# Patient Record
Sex: FEMALE | Race: WHITE | NOT HISPANIC OR LATINO | URBAN - METROPOLITAN AREA
[De-identification: names, ages, dates, MRNs, and addresses within clinical notes are randomized per-mention and may not be internally consistent; named-entity substitution may affect disease eponyms.]

---

## 2017-08-01 ENCOUNTER — INPATIENT (INPATIENT)
Facility: HOSPITAL | Age: 62
LOS: 18 days | Discharge: HOME | End: 2017-08-20
Attending: PSYCHIATRY & NEUROLOGY | Admitting: PSYCHIATRY & NEUROLOGY
Payer: MEDICARE

## 2017-08-01 VITALS
SYSTOLIC BLOOD PRESSURE: 124 MMHG | RESPIRATION RATE: 16 BRPM | OXYGEN SATURATION: 99 % | DIASTOLIC BLOOD PRESSURE: 80 MMHG | TEMPERATURE: 98 F | HEART RATE: 82 BPM

## 2017-08-01 DIAGNOSIS — F20.9 SCHIZOPHRENIA, UNSPECIFIED: ICD-10-CM

## 2017-08-01 DIAGNOSIS — R69 ILLNESS, UNSPECIFIED: ICD-10-CM

## 2017-08-01 DIAGNOSIS — F25.0 SCHIZOAFFECTIVE DISORDER, BIPOLAR TYPE: ICD-10-CM

## 2017-08-01 LAB
ALBUMIN SERPL ELPH-MCNC: 4.4 G/DL — SIGNIFICANT CHANGE UP (ref 3.3–5)
ALP SERPL-CCNC: 79 U/L — SIGNIFICANT CHANGE UP (ref 40–120)
ALT FLD-CCNC: 46 U/L — HIGH (ref 4–33)
AMPHET UR-MCNC: NEGATIVE — SIGNIFICANT CHANGE UP
APAP SERPL-MCNC: < 15 UG/ML — LOW (ref 15–25)
APPEARANCE UR: CLEAR — SIGNIFICANT CHANGE UP
AST SERPL-CCNC: 44 U/L — HIGH (ref 4–32)
BARBITURATES MEASUREMENT: NEGATIVE — SIGNIFICANT CHANGE UP
BARBITURATES UR SCN-MCNC: NEGATIVE — SIGNIFICANT CHANGE UP
BASOPHILS # BLD AUTO: 0.03 K/UL — SIGNIFICANT CHANGE UP (ref 0–0.2)
BASOPHILS NFR BLD AUTO: 0.3 % — SIGNIFICANT CHANGE UP (ref 0–2)
BENZODIAZ SERPL-MCNC: NEGATIVE — SIGNIFICANT CHANGE UP
BENZODIAZ UR-MCNC: NEGATIVE — SIGNIFICANT CHANGE UP
BILIRUB SERPL-MCNC: 0.3 MG/DL — SIGNIFICANT CHANGE UP (ref 0.2–1.2)
BILIRUB UR-MCNC: NEGATIVE — SIGNIFICANT CHANGE UP
BLOOD UR QL VISUAL: NEGATIVE — SIGNIFICANT CHANGE UP
BUN SERPL-MCNC: 22 MG/DL — SIGNIFICANT CHANGE UP (ref 7–23)
CALCIUM SERPL-MCNC: 9.3 MG/DL — SIGNIFICANT CHANGE UP (ref 8.4–10.5)
CANNABINOIDS UR-MCNC: NEGATIVE — SIGNIFICANT CHANGE UP
CHLORIDE SERPL-SCNC: 102 MMOL/L — SIGNIFICANT CHANGE UP (ref 98–107)
CO2 SERPL-SCNC: 21 MMOL/L — LOW (ref 22–31)
COCAINE METAB.OTHER UR-MCNC: NEGATIVE — SIGNIFICANT CHANGE UP
COD CRY URNS QL: SIGNIFICANT CHANGE UP (ref 0–0)
COLOR SPEC: YELLOW — SIGNIFICANT CHANGE UP
CREAT SERPL-MCNC: 0.77 MG/DL — SIGNIFICANT CHANGE UP (ref 0.5–1.3)
EOSINOPHIL # BLD AUTO: 0.01 K/UL — SIGNIFICANT CHANGE UP (ref 0–0.5)
EOSINOPHIL NFR BLD AUTO: 0.1 % — SIGNIFICANT CHANGE UP (ref 0–6)
ETHANOL BLD-MCNC: < 10 MG/DL — SIGNIFICANT CHANGE UP
GLUCOSE SERPL-MCNC: 104 MG/DL — HIGH (ref 70–99)
GLUCOSE UR-MCNC: NEGATIVE — SIGNIFICANT CHANGE UP
HCT VFR BLD CALC: 41.4 % — SIGNIFICANT CHANGE UP (ref 34.5–45)
HGB BLD-MCNC: 13.6 G/DL — SIGNIFICANT CHANGE UP (ref 11.5–15.5)
IMM GRANULOCYTES # BLD AUTO: 0.04 # — SIGNIFICANT CHANGE UP
IMM GRANULOCYTES NFR BLD AUTO: 0.4 % — SIGNIFICANT CHANGE UP (ref 0–1.5)
KETONES UR-MCNC: NEGATIVE — SIGNIFICANT CHANGE UP
LEUKOCYTE ESTERASE UR-ACNC: NEGATIVE — SIGNIFICANT CHANGE UP
LYMPHOCYTES # BLD AUTO: 2.18 K/UL — SIGNIFICANT CHANGE UP (ref 1–3.3)
LYMPHOCYTES # BLD AUTO: 20.9 % — SIGNIFICANT CHANGE UP (ref 13–44)
MCHC RBC-ENTMCNC: 29.3 PG — SIGNIFICANT CHANGE UP (ref 27–34)
MCHC RBC-ENTMCNC: 32.9 % — SIGNIFICANT CHANGE UP (ref 32–36)
MCV RBC AUTO: 89.2 FL — SIGNIFICANT CHANGE UP (ref 80–100)
METHADONE UR-MCNC: NEGATIVE — SIGNIFICANT CHANGE UP
MONOCYTES # BLD AUTO: 0.97 K/UL — HIGH (ref 0–0.9)
MONOCYTES NFR BLD AUTO: 9.3 % — SIGNIFICANT CHANGE UP (ref 2–14)
MUCOUS THREADS # UR AUTO: SIGNIFICANT CHANGE UP
NEUTROPHILS # BLD AUTO: 7.18 K/UL — SIGNIFICANT CHANGE UP (ref 1.8–7.4)
NEUTROPHILS NFR BLD AUTO: 69 % — SIGNIFICANT CHANGE UP (ref 43–77)
NITRITE UR-MCNC: NEGATIVE — SIGNIFICANT CHANGE UP
NRBC # FLD: 0 — SIGNIFICANT CHANGE UP
OPIATES UR-MCNC: NEGATIVE — SIGNIFICANT CHANGE UP
OXYCODONE UR-MCNC: NEGATIVE — SIGNIFICANT CHANGE UP
PCP UR-MCNC: NEGATIVE — SIGNIFICANT CHANGE UP
PH UR: 5.5 — SIGNIFICANT CHANGE UP (ref 4.6–8)
PLATELET # BLD AUTO: 217 K/UL — SIGNIFICANT CHANGE UP (ref 150–400)
PMV BLD: 12.3 FL — SIGNIFICANT CHANGE UP (ref 7–13)
POTASSIUM SERPL-MCNC: 3.8 MMOL/L — SIGNIFICANT CHANGE UP (ref 3.5–5.3)
POTASSIUM SERPL-SCNC: 3.8 MMOL/L — SIGNIFICANT CHANGE UP (ref 3.5–5.3)
PROT SERPL-MCNC: 7 G/DL — SIGNIFICANT CHANGE UP (ref 6–8.3)
PROT UR-MCNC: 20 — SIGNIFICANT CHANGE UP
RBC # BLD: 4.64 M/UL — SIGNIFICANT CHANGE UP (ref 3.8–5.2)
RBC # FLD: 13.6 % — SIGNIFICANT CHANGE UP (ref 10.3–14.5)
SALICYLATES SERPL-MCNC: < 5 MG/DL — LOW (ref 15–30)
SODIUM SERPL-SCNC: 141 MMOL/L — SIGNIFICANT CHANGE UP (ref 135–145)
SP GR SPEC: 1.03 — SIGNIFICANT CHANGE UP (ref 1–1.03)
SQUAMOUS # UR AUTO: SIGNIFICANT CHANGE UP
TSH SERPL-MCNC: 1.87 UIU/ML — SIGNIFICANT CHANGE UP (ref 0.27–4.2)
UROBILINOGEN FLD QL: NORMAL E.U. — SIGNIFICANT CHANGE UP (ref 0.1–0.2)
WBC # BLD: 10.41 K/UL — SIGNIFICANT CHANGE UP (ref 3.8–10.5)
WBC # FLD AUTO: 10.41 K/UL — SIGNIFICANT CHANGE UP (ref 3.8–10.5)
WBC UR QL: SIGNIFICANT CHANGE UP (ref 0–?)

## 2017-08-01 PROCEDURE — 99285 EMERGENCY DEPT VISIT HI MDM: CPT

## 2017-08-01 RX ORDER — HALOPERIDOL DECANOATE 100 MG/ML
5 INJECTION INTRAMUSCULAR ONCE
Qty: 0 | Refills: 0 | Status: COMPLETED | OUTPATIENT
Start: 2017-08-01 | End: 2017-08-01

## 2017-08-01 RX ORDER — RISPERIDONE 4 MG/1
1 TABLET ORAL
Qty: 0 | Refills: 0 | Status: DISCONTINUED | OUTPATIENT
Start: 2017-08-01 | End: 2017-08-01

## 2017-08-01 RX ORDER — HALOPERIDOL DECANOATE 100 MG/ML
5 INJECTION INTRAMUSCULAR EVERY 6 HOURS
Qty: 0 | Refills: 0 | Status: DISCONTINUED | OUTPATIENT
Start: 2017-08-01 | End: 2017-08-20

## 2017-08-01 RX ORDER — RISPERIDONE 4 MG/1
2 TABLET ORAL AT BEDTIME
Qty: 0 | Refills: 0 | Status: DISCONTINUED | OUTPATIENT
Start: 2017-08-01 | End: 2017-08-03

## 2017-08-01 RX ORDER — HALOPERIDOL DECANOATE 100 MG/ML
5 INJECTION INTRAMUSCULAR ONCE
Qty: 0 | Refills: 0 | Status: DISCONTINUED | OUTPATIENT
Start: 2017-08-01 | End: 2017-08-20

## 2017-08-01 RX ADMIN — Medication 2 MILLIGRAM(S): at 12:18

## 2017-08-01 RX ADMIN — HALOPERIDOL DECANOATE 5 MILLIGRAM(S): 100 INJECTION INTRAMUSCULAR at 12:18

## 2017-08-01 NOTE — ED BEHAVIORAL HEALTH ASSESSMENT NOTE - OTHER PAST PSYCHIATRIC HISTORY (INCLUDE DETAILS REGARDING ONSET, COURSE OF ILLNESS, INPATIENT/OUTPATIENT TREATMENT)
Patient had several hospitalizations, including at [facility] in 1970's-80's. Last admitted to [facility]PE[facility] at [facility] in Aug 2016.  Was living in a group home in her 20's and 30's but has lived independently for the past 20 years.  Most recently followed at [facility].

## 2017-08-01 NOTE — ED ADULT NURSE NOTE - OBJECTIVE STATEMENT
p/t BIBA agitated from home, p/t is restless,  with flight of ideas not cooperative @ present MD by the bedside

## 2017-08-01 NOTE — ED PROVIDER NOTE - CHPI ED SYMPTOMS NEG
no weakness/no disorientation/no paranoia/no hallucinations/no change in level of consciousness/no homicidal/no confusion/no suicidal

## 2017-08-01 NOTE — ED BEHAVIORAL HEALTH ASSESSMENT NOTE - DETAILS
Robel-sedation; ginol-retinal damage Brother with mental illness and has been hospitalized in the past Gwen, medical student Neighbor/friend aware

## 2017-08-01 NOTE — ED BEHAVIORAL HEALTH ASSESSMENT NOTE - SUMMARY
62 year old F with chronic psychotic disorder who has been relatively stable in treatment for past 30+ years but stopped all meds 2 weeks ago and has decompensated. She is now disorganized, with perseveration, thought blocking, mood lability, and paranoid ideation.  She has not been responding to family and in her disorganized and psychotic state, has been caring poorly for herself necessitating inpatient admission.

## 2017-08-01 NOTE — ED PROVIDER NOTE - ATTENDING CONTRIBUTION TO CARE
Seen and examined, have discussed plan of care with NP.   I agree with note as stated above, having amended the EMR as needed to reflect the findings.

## 2017-08-01 NOTE — ED BEHAVIORAL HEALTH ASSESSMENT NOTE - DESCRIPTION
As above. Received Haldol 5mg/Ativan 2mg IM for agitation at 11:15A Retinal damage Lives alone.  Sister in [place].  Never . No children. Worked as  for 30 years, now retired

## 2017-08-01 NOTE — ED ADULT TRIAGE NOTE - CHIEF COMPLAINT QUOTE
Pt non compliant with meds  x several weeks brought in by ems and family member with increasing agitation.

## 2017-08-01 NOTE — ED ADULT NURSE REASSESSMENT NOTE - NS ED NURSE REASSESS COMMENT FT1
14:50-Patient transported to [address] via EMS, safety maintained.
Received pt currently sleeping in  room 3, NAD, needs met, blood work and EKG done, pt currently awaiting further MD evaluation, will continue to monitor pt.

## 2017-08-01 NOTE — ED BEHAVIORAL HEALTH ASSESSMENT NOTE - PSYCHIATRIC ISSUES AND PLAN (INCLUDE STANDING AND PRN MEDICATION)
1. Restart Risperdal 1mg BID for psychosis 2. Haldol/Ativan PRN agitation 3. Contact [facility] providers as their office is closed today due to the [facility] Holiday

## 2017-08-01 NOTE — ED PROVIDER NOTE - MEDICAL DECISION MAKING DETAILS
63y/o Female hx of schizophrenia non compliant with meds brought to ed for psych eval in setting of agitation.  Pt is well appearing w/o visible signs of physical injuries on exam, alert and oriented, articulate speech, head NCAT, no C-spine tend, CN II- XII intact, strength 5/5 x 4 limbs, ambulating w/ steady gait, lungs are clear bilat, cor rrr, abd sft, nt, nd, nr, no guarding, ext no edema.  Remainder of plan as per psych-         63y/o Female hx of schizophrenia non compliant with meds brought to ed for psych eval in setting of agitation.  Pt is disorganized, no visible signs of physical injuries on exam, alert and oriented, articulate speech, head NCAT, no C-spine tend, ambulating w/ steady gait, lungs are clear bilat, cor rrr, abd sft, nt, nd, nr, no guarding, ext no edema, equal strength to all extremities.  Labs wnl- medically clear for psych admission-

## 2017-08-01 NOTE — ED PROVIDER NOTE - OBJECTIVE STATEMENT
The patient is a 62y Female hx of schizophrenia non compliant with meds brought to ed for psych eval in setting of agitation.  Pt denies all medical complaints, no recent injuries, trauma or falls, no headache, back or neck pain, no abd/flank pain, no uti/urinary symptoms, nausea or vomiting, no fever or chills, no cp or sob, no palpitations or diaphoresis.  Denies etoh or illicit drugs, no SI/HI, no AVH.  Endorsed no other symptoms.

## 2017-08-01 NOTE — ED BEHAVIORAL HEALTH ASSESSMENT NOTE - DIFFERENTIAL
Will have working diagnosis of schizophrenia but may actually be schizoaffective disorder, bipolar type given some symptoms for tawanda

## 2017-08-01 NOTE — ED BEHAVIORAL HEALTH ASSESSMENT NOTE - HPI (INCLUDE ILLNESS QUALITY, SEVERITY, DURATION, TIMING, CONTEXT, MODIFYING FACTORS, ASSOCIATED SIGNS AND SYMPTOMS)
Ms. Cordova is a 62 year old Nondenominational,  F who lives alone.  She has a history of schizophrenia and most recently was treated by [facility] but abruptly discontinued treatment with them approx 2 weeks ago.  She has multile prior inpatient admissions, mostly in the 1970's and 1980s, but also in August 2016 at [facility] (possibly just [facility]).  In the setting of abrupt discontinuation of treatment over the past 2 weeks, the patient has recently been paranoid, disoriented, acting strangely, and her friend became concerned. Today, she came to her friend's house and was talking nonsensically and her friend activated "[employer] [facility] to bring her to the ED for a psychiatric evaluation.    According to her sister and friend, the patient is Ms. Cordova is a 62 year old Yazidism,  F who lives alone.  She has a history of schizophrenia and most recently was treated by [facility] but abruptly discontinued treatment with them approx 2 weeks ago.  She has multile prior inpatient admissions, mostly in the 1970's and 1980s, but also in August 2016 at [facility] (possibly just [facility]).  In the setting of abrupt discontinuation of treatment over the past 2 weeks, the patient has recently been paranoid, disoriented, acting strangely, and her friend became concerned. Today, she came to her friend's house and was talking nonsensically and her [facility] ED for a psychiatric evaluation.    According to her sister and friend, the patient does well when she is on her medications and is able to fully take care of self.  This was the case as recently as 1 month ago when she stayed with her sister for 2 weeks in [place].  However, for some reason, the patient decided to abruptly terminate her treatment with her therapist and psychiatrist at [facility] 2 weeks ago.  She was taking risperdal and xanax (sister does not know doses). Since that time, she has not been taking her sister's phone calls and has been hanging up on friends and family. She has been expressing concern that her brother is dead (he is living). according to her friend, there have been occasions when she was noted to be screaming while walking down the street in the neighborhood. She has also been complaining of strange smells and threw out all of her clothes yesterday, convinced that they were contaminated.      In the ED, patient is labile and hostile, refusing to change into gown. She was shouting about how "I don't want to talk to Dalia!"  (Dalia is apparently her therapist).  She repeats the same phrases over and over "I have the right to decide what happens!" and "Tell me what is happening!"  SHe is not oriented to time or situation. She denies SI or HI but did get aggressive with staff when they tried to assist her with changing clothes. She does mention smelling something "strange" and worries about this.  She expresses an opinion that she does not need any mental health treatment.

## 2017-08-02 PROCEDURE — 99222 1ST HOSP IP/OBS MODERATE 55: CPT | Mod: GC

## 2017-08-02 RX ADMIN — RISPERIDONE 2 MILLIGRAM(S): 4 TABLET ORAL at 15:51

## 2017-08-03 LAB
CHOLEST SERPL-MCNC: 187 MG/DL — SIGNIFICANT CHANGE UP (ref 120–199)
HBA1C BLD-MCNC: 5.5 % — SIGNIFICANT CHANGE UP (ref 4–5.6)
HDLC SERPL-MCNC: 89 MG/DL — HIGH (ref 45–65)
LIPID PNL WITH DIRECT LDL SERPL: 90 MG/DL — SIGNIFICANT CHANGE UP
TRIGL SERPL-MCNC: 63 MG/DL — SIGNIFICANT CHANGE UP (ref 10–149)

## 2017-08-03 PROCEDURE — 99232 SBSQ HOSP IP/OBS MODERATE 35: CPT | Mod: GC

## 2017-08-03 RX ORDER — RISPERIDONE 4 MG/1
3 TABLET ORAL AT BEDTIME
Qty: 0 | Refills: 0 | Status: DISCONTINUED | OUTPATIENT
Start: 2017-08-03 | End: 2017-08-04

## 2017-08-03 RX ORDER — TRAZODONE HCL 50 MG
50 TABLET ORAL AT BEDTIME
Qty: 0 | Refills: 0 | Status: DISCONTINUED | OUTPATIENT
Start: 2017-08-03 | End: 2017-08-14

## 2017-08-03 RX ORDER — LANOLIN ALCOHOL/MO/W.PET/CERES
3 CREAM (GRAM) TOPICAL AT BEDTIME
Qty: 0 | Refills: 0 | Status: DISCONTINUED | OUTPATIENT
Start: 2017-08-03 | End: 2017-08-20

## 2017-08-03 RX ADMIN — RISPERIDONE 3 MILLIGRAM(S): 4 TABLET ORAL at 21:27

## 2017-08-03 RX ADMIN — Medication 2 MILLIGRAM(S): at 05:18

## 2017-08-03 RX ADMIN — Medication 1 MILLIGRAM(S): at 21:28

## 2017-08-03 RX ADMIN — HALOPERIDOL DECANOATE 5 MILLIGRAM(S): 100 INJECTION INTRAMUSCULAR at 05:18

## 2017-08-03 RX ADMIN — Medication 3 MILLIGRAM(S): at 21:27

## 2017-08-04 PROCEDURE — 99232 SBSQ HOSP IP/OBS MODERATE 35: CPT | Mod: GC

## 2017-08-04 RX ORDER — RISPERIDONE 4 MG/1
4 TABLET ORAL AT BEDTIME
Qty: 0 | Refills: 0 | Status: DISCONTINUED | OUTPATIENT
Start: 2017-08-04 | End: 2017-08-17

## 2017-08-04 RX ADMIN — RISPERIDONE 4 MILLIGRAM(S): 4 TABLET ORAL at 20:59

## 2017-08-04 RX ADMIN — Medication 50 MILLIGRAM(S): at 21:00

## 2017-08-05 PROCEDURE — 99232 SBSQ HOSP IP/OBS MODERATE 35: CPT

## 2017-08-05 RX ADMIN — Medication 1 MILLIGRAM(S): at 00:55

## 2017-08-05 RX ADMIN — Medication 3 MILLIGRAM(S): at 21:22

## 2017-08-05 RX ADMIN — HALOPERIDOL DECANOATE 5 MILLIGRAM(S): 100 INJECTION INTRAMUSCULAR at 00:55

## 2017-08-05 RX ADMIN — RISPERIDONE 4 MILLIGRAM(S): 4 TABLET ORAL at 20:25

## 2017-08-05 RX ADMIN — Medication 50 MILLIGRAM(S): at 21:22

## 2017-08-06 PROCEDURE — 99232 SBSQ HOSP IP/OBS MODERATE 35: CPT

## 2017-08-06 RX ORDER — RISPERIDONE 4 MG/1
2 TABLET ORAL DAILY
Qty: 0 | Refills: 0 | Status: DISCONTINUED | OUTPATIENT
Start: 2017-08-06 | End: 2017-08-17

## 2017-08-06 RX ADMIN — RISPERIDONE 2 MILLIGRAM(S): 4 TABLET ORAL at 10:17

## 2017-08-06 RX ADMIN — RISPERIDONE 4 MILLIGRAM(S): 4 TABLET ORAL at 20:53

## 2017-08-06 RX ADMIN — Medication 1 MILLIGRAM(S): at 20:55

## 2017-08-06 RX ADMIN — Medication 3 MILLIGRAM(S): at 20:55

## 2017-08-07 PROCEDURE — 99232 SBSQ HOSP IP/OBS MODERATE 35: CPT | Mod: GC

## 2017-08-07 RX ADMIN — RISPERIDONE 4 MILLIGRAM(S): 4 TABLET ORAL at 20:31

## 2017-08-07 RX ADMIN — RISPERIDONE 2 MILLIGRAM(S): 4 TABLET ORAL at 08:16

## 2017-08-08 PROCEDURE — 99232 SBSQ HOSP IP/OBS MODERATE 35: CPT | Mod: GC

## 2017-08-08 RX ADMIN — RISPERIDONE 4 MILLIGRAM(S): 4 TABLET ORAL at 21:32

## 2017-08-08 RX ADMIN — RISPERIDONE 2 MILLIGRAM(S): 4 TABLET ORAL at 09:28

## 2017-08-08 RX ADMIN — Medication 50 MILLIGRAM(S): at 21:35

## 2017-08-08 RX ADMIN — Medication 1 MILLIGRAM(S): at 21:35

## 2017-08-08 RX ADMIN — Medication 3 MILLIGRAM(S): at 21:35

## 2017-08-09 LAB
24R-OH-CALCIDIOL SERPL-MCNC: 42 NG/ML — SIGNIFICANT CHANGE UP (ref 30–100)
FOLATE SERPL-MCNC: 14.7 NG/ML — SIGNIFICANT CHANGE UP (ref 4.7–20)
T PALLIDUM AB TITR SER: NEGATIVE — SIGNIFICANT CHANGE UP
VIT B12 SERPL-MCNC: 569 PG/ML — SIGNIFICANT CHANGE UP (ref 200–900)

## 2017-08-09 PROCEDURE — 99232 SBSQ HOSP IP/OBS MODERATE 35: CPT | Mod: GC

## 2017-08-09 RX ADMIN — RISPERIDONE 4 MILLIGRAM(S): 4 TABLET ORAL at 21:25

## 2017-08-09 RX ADMIN — Medication 1 MILLIGRAM(S): at 21:25

## 2017-08-09 RX ADMIN — RISPERIDONE 2 MILLIGRAM(S): 4 TABLET ORAL at 09:24

## 2017-08-09 RX ADMIN — Medication 1 TABLET(S): at 09:24

## 2017-08-09 RX ADMIN — Medication 3 MILLIGRAM(S): at 21:25

## 2017-08-10 PROCEDURE — 99232 SBSQ HOSP IP/OBS MODERATE 35: CPT | Mod: GC

## 2017-08-10 RX ORDER — SERTRALINE 25 MG/1
25 TABLET, FILM COATED ORAL ONCE
Qty: 0 | Refills: 0 | Status: COMPLETED | OUTPATIENT
Start: 2017-08-10 | End: 2017-08-10

## 2017-08-10 RX ORDER — SERTRALINE 25 MG/1
25 TABLET, FILM COATED ORAL DAILY
Qty: 0 | Refills: 0 | Status: DISCONTINUED | OUTPATIENT
Start: 2017-08-10 | End: 2017-08-11

## 2017-08-10 RX ADMIN — Medication 50 MILLIGRAM(S): at 20:51

## 2017-08-10 RX ADMIN — Medication 1 TABLET(S): at 09:14

## 2017-08-10 RX ADMIN — SERTRALINE 25 MILLIGRAM(S): 25 TABLET, FILM COATED ORAL at 10:29

## 2017-08-10 RX ADMIN — RISPERIDONE 4 MILLIGRAM(S): 4 TABLET ORAL at 20:51

## 2017-08-10 RX ADMIN — RISPERIDONE 2 MILLIGRAM(S): 4 TABLET ORAL at 09:14

## 2017-08-11 PROCEDURE — 99232 SBSQ HOSP IP/OBS MODERATE 35: CPT | Mod: GC

## 2017-08-11 RX ADMIN — Medication 1 TABLET(S): at 08:37

## 2017-08-11 RX ADMIN — RISPERIDONE 4 MILLIGRAM(S): 4 TABLET ORAL at 21:12

## 2017-08-11 RX ADMIN — Medication 1 MILLIGRAM(S): at 21:15

## 2017-08-11 RX ADMIN — RISPERIDONE 2 MILLIGRAM(S): 4 TABLET ORAL at 08:37

## 2017-08-11 RX ADMIN — Medication 3 MILLIGRAM(S): at 21:15

## 2017-08-12 RX ADMIN — Medication 1 TABLET(S): at 08:38

## 2017-08-12 RX ADMIN — RISPERIDONE 2 MILLIGRAM(S): 4 TABLET ORAL at 08:38

## 2017-08-12 RX ADMIN — RISPERIDONE 4 MILLIGRAM(S): 4 TABLET ORAL at 21:00

## 2017-08-13 RX ADMIN — Medication 3 MILLIGRAM(S): at 22:00

## 2017-08-13 RX ADMIN — RISPERIDONE 4 MILLIGRAM(S): 4 TABLET ORAL at 21:17

## 2017-08-13 RX ADMIN — RISPERIDONE 2 MILLIGRAM(S): 4 TABLET ORAL at 09:09

## 2017-08-13 RX ADMIN — Medication 1 TABLET(S): at 09:09

## 2017-08-14 PROCEDURE — 99232 SBSQ HOSP IP/OBS MODERATE 35: CPT | Mod: GC

## 2017-08-14 RX ORDER — TRAZODONE HCL 50 MG
50 TABLET ORAL AT BEDTIME
Qty: 0 | Refills: 0 | Status: DISCONTINUED | OUTPATIENT
Start: 2017-08-14 | End: 2017-08-17

## 2017-08-14 RX ADMIN — RISPERIDONE 4 MILLIGRAM(S): 4 TABLET ORAL at 21:14

## 2017-08-14 RX ADMIN — Medication 1 TABLET(S): at 10:03

## 2017-08-14 RX ADMIN — RISPERIDONE 2 MILLIGRAM(S): 4 TABLET ORAL at 10:03

## 2017-08-14 RX ADMIN — Medication 50 MILLIGRAM(S): at 21:14

## 2017-08-15 PROCEDURE — 99232 SBSQ HOSP IP/OBS MODERATE 35: CPT | Mod: GC

## 2017-08-15 RX ADMIN — Medication 50 MILLIGRAM(S): at 21:53

## 2017-08-15 RX ADMIN — Medication 3 MILLIGRAM(S): at 21:50

## 2017-08-15 RX ADMIN — RISPERIDONE 4 MILLIGRAM(S): 4 TABLET ORAL at 22:00

## 2017-08-15 RX ADMIN — RISPERIDONE 2 MILLIGRAM(S): 4 TABLET ORAL at 08:14

## 2017-08-15 RX ADMIN — Medication 1 TABLET(S): at 08:14

## 2017-08-16 PROCEDURE — 99232 SBSQ HOSP IP/OBS MODERATE 35: CPT | Mod: GC

## 2017-08-16 RX ADMIN — RISPERIDONE 4 MILLIGRAM(S): 4 TABLET ORAL at 20:34

## 2017-08-16 RX ADMIN — Medication 50 MILLIGRAM(S): at 20:33

## 2017-08-16 RX ADMIN — Medication 1 TABLET(S): at 09:11

## 2017-08-16 RX ADMIN — Medication 3 MILLIGRAM(S): at 22:00

## 2017-08-16 RX ADMIN — Medication 1 MILLIGRAM(S): at 13:34

## 2017-08-16 RX ADMIN — RISPERIDONE 2 MILLIGRAM(S): 4 TABLET ORAL at 09:11

## 2017-08-17 PROCEDURE — 99232 SBSQ HOSP IP/OBS MODERATE 35: CPT | Mod: GC

## 2017-08-17 RX ORDER — RISPERIDONE 4 MG/1
6 TABLET ORAL AT BEDTIME
Qty: 0 | Refills: 0 | Status: DISCONTINUED | OUTPATIENT
Start: 2017-08-17 | End: 2017-08-20

## 2017-08-17 RX ORDER — RISPERIDONE 4 MG/1
2 TABLET ORAL
Qty: 28 | Refills: 0 | OUTPATIENT
Start: 2017-08-17 | End: 2017-08-31

## 2017-08-17 RX ORDER — MIRTAZAPINE 45 MG/1
7.5 TABLET, ORALLY DISINTEGRATING ORAL AT BEDTIME
Qty: 0 | Refills: 0 | Status: DISCONTINUED | OUTPATIENT
Start: 2017-08-17 | End: 2017-08-20

## 2017-08-17 RX ORDER — MIRTAZAPINE 45 MG/1
1 TABLET, ORALLY DISINTEGRATING ORAL
Qty: 14 | Refills: 0 | OUTPATIENT
Start: 2017-08-17 | End: 2017-08-31

## 2017-08-17 RX ORDER — ACETAMINOPHEN 500 MG
650 TABLET ORAL EVERY 6 HOURS
Qty: 0 | Refills: 0 | Status: DISCONTINUED | OUTPATIENT
Start: 2017-08-17 | End: 2017-08-20

## 2017-08-17 RX ADMIN — Medication 1 MILLIGRAM(S): at 10:10

## 2017-08-17 RX ADMIN — Medication 650 MILLIGRAM(S): at 13:30

## 2017-08-17 RX ADMIN — Medication 3 MILLIGRAM(S): at 21:30

## 2017-08-17 RX ADMIN — MIRTAZAPINE 7.5 MILLIGRAM(S): 45 TABLET, ORALLY DISINTEGRATING ORAL at 21:27

## 2017-08-17 RX ADMIN — Medication 1 TABLET(S): at 08:26

## 2017-08-17 RX ADMIN — RISPERIDONE 6 MILLIGRAM(S): 4 TABLET ORAL at 21:27

## 2017-08-17 RX ADMIN — Medication 650 MILLIGRAM(S): at 10:10

## 2017-08-18 PROBLEM — F20.9 SCHIZOPHRENIA, UNSPECIFIED: Chronic | Status: ACTIVE | Noted: 2017-08-01

## 2017-08-18 PROCEDURE — 99232 SBSQ HOSP IP/OBS MODERATE 35: CPT | Mod: GC

## 2017-08-18 RX ORDER — RISPERIDONE 4 MG/1
2 TABLET ORAL
Qty: 28 | Refills: 0 | OUTPATIENT
Start: 2017-08-18 | End: 2017-09-01

## 2017-08-18 RX ORDER — MIRTAZAPINE 45 MG/1
1 TABLET, ORALLY DISINTEGRATING ORAL
Qty: 14 | Refills: 0 | OUTPATIENT
Start: 2017-08-18 | End: 2017-09-01

## 2017-08-18 RX ORDER — LANOLIN ALCOHOL/MO/W.PET/CERES
1 CREAM (GRAM) TOPICAL
Qty: 0 | Refills: 0 | COMMUNITY
Start: 2017-08-18

## 2017-08-18 RX ADMIN — Medication 1 TABLET(S): at 08:31

## 2017-08-18 RX ADMIN — RISPERIDONE 6 MILLIGRAM(S): 4 TABLET ORAL at 20:53

## 2017-08-18 RX ADMIN — MIRTAZAPINE 7.5 MILLIGRAM(S): 45 TABLET, ORALLY DISINTEGRATING ORAL at 20:53

## 2017-08-18 RX ADMIN — Medication 3 MILLIGRAM(S): at 20:55

## 2017-08-19 RX ADMIN — MIRTAZAPINE 7.5 MILLIGRAM(S): 45 TABLET, ORALLY DISINTEGRATING ORAL at 21:04

## 2017-08-19 RX ADMIN — Medication 3 MILLIGRAM(S): at 21:28

## 2017-08-19 RX ADMIN — RISPERIDONE 6 MILLIGRAM(S): 4 TABLET ORAL at 21:06

## 2017-08-19 RX ADMIN — Medication 1 TABLET(S): at 09:07

## 2017-08-20 VITALS — TEMPERATURE: 97 F

## 2017-08-20 PROCEDURE — 99238 HOSP IP/OBS DSCHRG MGMT 30/<: CPT

## 2017-08-20 RX ADMIN — Medication 1 TABLET(S): at 10:54
